# Patient Record
Sex: FEMALE | Race: WHITE | NOT HISPANIC OR LATINO | Employment: OTHER | URBAN - METROPOLITAN AREA
[De-identification: names, ages, dates, MRNs, and addresses within clinical notes are randomized per-mention and may not be internally consistent; named-entity substitution may affect disease eponyms.]

---

## 2017-11-24 ENCOUNTER — OFFICE VISIT (OUTPATIENT)
Dept: URGENT CARE | Facility: PHYSICIAN GROUP | Age: 77
End: 2017-11-24
Payer: COMMERCIAL

## 2017-11-24 VITALS
DIASTOLIC BLOOD PRESSURE: 70 MMHG | SYSTOLIC BLOOD PRESSURE: 150 MMHG | HEART RATE: 92 BPM | TEMPERATURE: 98.5 F | OXYGEN SATURATION: 95 % | BODY MASS INDEX: 30.01 KG/M2 | HEIGHT: 68 IN | RESPIRATION RATE: 16 BRPM | WEIGHT: 198 LBS

## 2017-11-24 DIAGNOSIS — S76.311A HAMSTRING MUSCLE STRAIN, RIGHT, INITIAL ENCOUNTER: Primary | ICD-10-CM
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
## 2017-11-24 DIAGNOSIS — E66.9 OBESITY (BMI 30-39.9): ICD-10-CM

## 2017-11-24 PROCEDURE — 99203 OFFICE O/P NEW LOW 30 MIN: CPT | Performed by: PHYSICIAN ASSISTANT

## 2017-11-24 RX ORDER — LEVOTHYROXINE SODIUM 175 UG/1
175 TABLET ORAL
COMMUNITY

## 2017-11-24 RX ORDER — MELOXICAM 7.5 MG/1
7.5 TABLET ORAL DAILY
Qty: 30 TAB | Refills: 0 | Status: SHIPPED | OUTPATIENT
Start: 2017-11-24

## 2017-11-24 RX ORDER — POTASSIUM CHLORIDE 1.5 G/1.58G
20 POWDER, FOR SOLUTION ORAL 4 TIMES DAILY
COMMUNITY

## 2017-11-24 RX ORDER — ACETAMINOPHEN 500 MG
500-1000 TABLET ORAL EVERY 6 HOURS PRN
COMMUNITY

## 2017-11-24 ASSESSMENT — ENCOUNTER SYMPTOMS
MYALGIAS: 1
CONSTITUTIONAL NEGATIVE: 1
GASTROINTESTINAL NEGATIVE: 1
FALLS: 1
RESPIRATORY NEGATIVE: 1
CARDIOVASCULAR NEGATIVE: 1
PSYCHIATRIC NEGATIVE: 1
NEUROLOGICAL NEGATIVE: 1
EYES NEGATIVE: 1

## 2017-11-24 ASSESSMENT — PAIN SCALES - GENERAL: PAINLEVEL: 9=SEVERE PAIN

## 2017-11-24 NOTE — PROGRESS NOTES
Subjective:      Geeta Tyler is a 77 y.o. female who presents with Fall (fell out of bed in middle of night, laying on back, rolled off and on R side. pain localized to inferior of glutes)            HPI  Chief Complaint   Patient presents with   • Fall     fell out of bed in middle of night, laying on back, rolled off and on R side. pain localized to inferior of glutes       HPI:  Geeta Tyler is a 77 y.o. female who presents with right leg pain after a fall out of bed Around 3 AM today. Patient states that she fell from approximately 3 feet, has a low bed. She awoke on the ground. Denies headache or dizziness. Had immediate pain in her posterior hamstring. Has old injury/hamstring strain 20+ years ago. Has been using her daughter-in-law's walker. We'll be returning to [place] in several days.  Patient denies HA, SOB, chest pain, palpitations, fever, chills, or n/v/d.    Bilateral total knee  History reviewed. No pertinent past medical history.    History reviewed. No pertinent surgical history.    History reviewed. No pertinent family history.  No pertinent family history.    Social History     Social History   • Marital status: Single     Spouse name: N/A   • Number of children: N/A   • Years of education: N/A     Occupational History   • Not on file.     Social History Main Topics   • Smoking status: Never Smoker   • Smokeless tobacco: Never Used   • Alcohol use No   • Drug use: No   • Sexual activity: Not on file     Other Topics Concern   • Not on file     Social History Narrative   • No narrative on file         Current Outpatient Prescriptions:   •  potassium chloride, 20 mEq, Oral, 4XDAY, 11/24/2017  •  levothyroxine, 175 mcg, Oral, AM ES, 11/24/2017  •  conjugated estrogen, 0.625 mg, Oral, DAILY, 11/24/2017  •  Pseudoephedrine-APAP, Take  by mouth.  •  aspirin, 81 mg, Oral, DAILY  •  acetaminophen, 500-1,000 mg, Oral, Q6HRS PRN  •  meloxicam, 7.5 mg, Oral, DAILY    Not on File     Review of Systems  "  Constitutional: Negative.    HENT: Negative.    Eyes: Negative.    Respiratory: Negative.    Cardiovascular: Negative.    Gastrointestinal: Negative.    Genitourinary: Negative.    Musculoskeletal: Positive for falls, joint pain and myalgias.   Skin: Negative.    Neurological: Negative.    Endo/Heme/Allergies: Negative.    Psychiatric/Behavioral: Negative.           Objective:     /70   Pulse 92   Temp 36.9 °C (98.5 °F)   Resp 16   Ht 1.727 m (5' 8\")   Wt 89.8 kg (198 lb)   SpO2 95%   Breastfeeding? No   BMI 30.11 kg/m²      Physical Exam       Constitutional:  Appropriately groomed, pleasant affect, well nourished, and in no acute distress.    HEENT:  Head: Atraumatic, normocephalic.    Ears:  Hearing grossly intact to voice.    Eyes:  Conjunctivae clear, sclera white, and medial canthus without exudate bilaterally.      Lungs:  Lungs with normal respiratory excursion and effort.      Right Hip:   Point tenderness to palpation over the medial hamstring near the ischial insertion site. No erythema or bruising.  No lumbar spine step-offs or ttp.  No ttp over the greater trochanter.    Hips stable to downward applied stress.  No inguinal hernias noted on exam.  FROM for extension, flexion, abduction, and adduction.  Pain with adduction and hip flexion and extension.     Motor Examination: Quad/hamstring, abduction/adduction 5/5 without atrophy.   Special Tests:  Negative straight leg raise.  Ambrosio's negative.    Sensation equal bilaterally to light touch for L4, L5, and S1.    Neurovascular status intact, DP 2+.        Gait and station antalgic, walking with right foot rotated outward favoring the right leg. Walks with walker.    Derm:  No rashes or lesions with good turgor pressure.     Psychiatric:  Normal judgement, mood and affect.      Assessment/Plan:     1. Hamstring muscle strain, right, initial encounter  meloxicam (MOBIC) 7.5 MG Tab   2. Fall, initial encounter     3. Obesity (BMI 30-39.9)  " Patient identified as having weight management issue.  Appropriate orders and counseling given.      Patient presents with suspected hamstring muscle strain due to fall for approximately 2-3 feet while sleeping. No evidence of head injury. Patient denies loss of consciousness however fall was not witnessed. No focal neurological deficits on exam. Patient not on blood thinners besides baby aspirin. No significant ecchymosis. Suspect worsening bruising over the next several days discussed with patient follow with PCP. Meloxicam 1-2 times a day as needed in addition to heat and ice and elevation.    Patient was in agreement with this treatment plan and seemed to understand without barriers. All questions were encouraged and answered.  Reviewed signs and symptoms of when to seek emergency medical care.     Please note that this dictation was created using voice recognition software.  I have made every reasonable attempt to correct obvious errors, but I expect there are errors of teofilo and possibly content that I did not discover before finalizing the note.

## 2017-11-24 NOTE — PATIENT INSTRUCTIONS
Hamstring.  Meloxicam morning and evening.  Take with food.  You have a muscle strain.  Avoid any activities that cause pain for the next week or until symptoms improve.  Use heat and ice for the next several days.  Use ice for 20 minutes and then off for 20 minutes.  Then use heat for 20 minutes and then off.  Repeat at least 4 times over the next several days.    Avoid stretching now.  Once your symptoms improve, gradually start stretching using the handout provided.

## 2018-05-14 ENCOUNTER — HOSPITAL ENCOUNTER (OUTPATIENT)
Facility: MEDICAL CENTER | Age: 78
End: 2018-05-14
Attending: SURGERY
Payer: COMMERCIAL